# Patient Record
Sex: MALE | Race: WHITE | NOT HISPANIC OR LATINO | Employment: FULL TIME
[De-identification: names, ages, dates, MRNs, and addresses within clinical notes are randomized per-mention and may not be internally consistent; named-entity substitution may affect disease eponyms.]

---

## 2017-09-06 ENCOUNTER — HOSPITAL (OUTPATIENT)
Dept: OTHER | Age: 82
End: 2017-09-06

## 2017-09-08 ENCOUNTER — DIAGNOSTIC TRANS (OUTPATIENT)
Dept: OTHER | Age: 82
End: 2017-09-08

## 2017-10-11 ENCOUNTER — HOSPITAL (OUTPATIENT)
Dept: OTHER | Age: 82
End: 2017-10-11

## 2017-10-11 ENCOUNTER — CHARTING TRANS (OUTPATIENT)
Dept: OTHER | Age: 82
End: 2017-10-11

## 2018-01-13 ENCOUNTER — HOSPITAL (OUTPATIENT)
Dept: OTHER | Age: 83
End: 2018-01-13
Attending: INTERNAL MEDICINE

## 2018-01-27 ENCOUNTER — DIAGNOSTIC TRANS (OUTPATIENT)
Dept: OTHER | Age: 83
End: 2018-01-27

## 2018-04-05 ENCOUNTER — HOSPITAL (OUTPATIENT)
Dept: OTHER | Age: 83
End: 2018-04-05

## 2018-04-24 ENCOUNTER — DIAGNOSTIC TRANS (OUTPATIENT)
Dept: OTHER | Age: 83
End: 2018-04-24

## 2018-12-07 PROCEDURE — 84402 ASSAY OF FREE TESTOSTERONE: CPT | Performed by: INTERNAL MEDICINE

## 2018-12-07 PROCEDURE — 84403 ASSAY OF TOTAL TESTOSTERONE: CPT | Performed by: INTERNAL MEDICINE

## 2018-12-07 PROCEDURE — 81003 URINALYSIS AUTO W/O SCOPE: CPT | Performed by: INTERNAL MEDICINE

## 2019-01-17 ENCOUNTER — HOSPITAL (OUTPATIENT)
Dept: OTHER | Age: 84
End: 2019-01-17
Attending: UROLOGY

## 2019-08-21 ENCOUNTER — DIAGNOSTIC TRANS (OUTPATIENT)
Dept: OTHER | Age: 84
End: 2019-08-21

## 2019-08-21 ENCOUNTER — HOSPITAL (OUTPATIENT)
Dept: OTHER | Age: 84
End: 2019-08-21

## 2019-08-21 LAB
ALBUMIN SERPL-MCNC: 3.7 G/DL (ref 3.6–5.1)
ALBUMIN/GLOB SERPL: 0.9 {RATIO} (ref 1–2.4)
ALP SERPL-CCNC: 84 UNITS/L (ref 45–117)
ALT SERPL-CCNC: 17 UNITS/L
ANALYZER ANC (IANC): ABNORMAL
ANION GAP SERPL CALC-SCNC: 10 MMOL/L (ref 10–20)
AST SERPL-CCNC: 17 UNITS/L
BASOPHILS # BLD: 0 K/MCL (ref 0–0.3)
BASOPHILS NFR BLD: 0 %
BILIRUB SERPL-MCNC: 0.5 MG/DL (ref 0.2–1)
BUN SERPL-MCNC: 20 MG/DL (ref 6–20)
BUN/CREAT SERPL: 19 (ref 7–25)
CALCIUM SERPL-MCNC: 9.1 MG/DL (ref 8.4–10.2)
CHLORIDE SERPL-SCNC: 105 MMOL/L (ref 98–107)
CO2 SERPL-SCNC: 28 MMOL/L (ref 21–32)
CREAT SERPL-MCNC: 1.03 MG/DL (ref 0.67–1.17)
DIFFERENTIAL METHOD BLD: ABNORMAL
EOSINOPHIL # BLD: 0.1 K/MCL (ref 0.1–0.5)
EOSINOPHIL NFR BLD: 1 %
ERYTHROCYTE [DISTWIDTH] IN BLOOD: 12.6 % (ref 11–15)
GLOBULIN SER-MCNC: 4.1 G/DL (ref 2–4)
GLUCOSE SERPL-MCNC: 99 MG/DL (ref 65–99)
HCT VFR BLD CALC: 45.8 % (ref 39–51)
HGB BLD-MCNC: 15.3 G/DL (ref 13–17)
IMM GRANULOCYTES # BLD AUTO: 0 K/MCL (ref 0–0.2)
IMM GRANULOCYTES NFR BLD: 0 %
LYMPHOCYTES # BLD: 1.2 K/MCL (ref 1–4)
LYMPHOCYTES NFR BLD: 16 %
MAGNESIUM SERPL-MCNC: 2.1 MG/DL (ref 1.7–2.4)
MCH RBC QN AUTO: 34.2 PG (ref 26–34)
MCHC RBC AUTO-ENTMCNC: 33.4 G/DL (ref 32–36.5)
MCV RBC AUTO: 102.2 FL (ref 78–100)
MONOCYTES # BLD: 0.7 K/MCL (ref 0.3–0.9)
MONOCYTES NFR BLD: 9 %
NEUTROPHILS # BLD: 5.7 K/MCL (ref 1.8–7.7)
NEUTROPHILS NFR BLD: 74 %
NEUTS SEG NFR BLD: ABNORMAL %
NRBC (NRBCRE): 0 /100 WBC
PLATELET # BLD: 165 K/MCL (ref 140–450)
POTASSIUM SERPL-SCNC: 4.3 MMOL/L (ref 3.4–5.1)
POTASSIUM SERPL-SCNC: ABNORMAL MMOL/L
PROT SERPL-MCNC: 7.8 G/DL (ref 6.4–8.2)
RBC # BLD: 4.48 MIL/MCL (ref 4.5–5.9)
SODIUM SERPL-SCNC: 139 MMOL/L (ref 135–145)
TROPONIN I SERPL HS-MCNC: <0.02 NG/ML
WBC # BLD: 7.8 K/MCL (ref 4.2–11)

## 2019-09-25 ENCOUNTER — DIAGNOSTIC TRANS (OUTPATIENT)
Dept: OTHER | Age: 84
End: 2019-09-25

## 2020-10-31 ENCOUNTER — LAB SERVICES (OUTPATIENT)
Dept: LAB | Age: 85
End: 2020-10-31

## 2020-10-31 DIAGNOSIS — Z01.818 PRE-OP TESTING: Primary | ICD-10-CM

## 2020-10-31 LAB
SARS-COV-2 RNA RESP QL NAA+PROBE: NOT DETECTED
SERVICE CMNT-IMP: NORMAL
SPECIMEN SOURCE: NORMAL

## 2020-10-31 PROCEDURE — U0003 INFECTIOUS AGENT DETECTION BY NUCLEIC ACID (DNA OR RNA); SEVERE ACUTE RESPIRATORY SYNDROME CORONAVIRUS 2 (SARS-COV-2) (CORONAVIRUS DISEASE [COVID-19]), AMPLIFIED PROBE TECHNIQUE, MAKING USE OF HIGH THROUGHPUT TECHNOLOGIES AS DESCRIBED BY CMS-2020-01-R: HCPCS | Performed by: FAMILY MEDICINE

## 2021-01-13 ENCOUNTER — APPOINTMENT (OUTPATIENT)
Dept: GASTROENTEROLOGY | Age: 86
End: 2021-01-13

## 2021-03-10 DIAGNOSIS — Z23 NEED FOR VACCINATION: ICD-10-CM

## 2021-04-28 RX ORDER — LATANOPROST 50 UG/ML
1 SOLUTION/ DROPS OPHTHALMIC
COMMUNITY
Start: 1900-01-01

## 2021-04-28 RX ORDER — TIMOLOL MALEATE 5 MG/ML
1 SOLUTION/ DROPS OPHTHALMIC
COMMUNITY
Start: 1900-01-01

## 2021-04-28 ASSESSMENT — ACTIVITIES OF DAILY LIVING (ADL): SENSORY_SUPPORT_DEVICES: HEARING AIDS;EYEGLASSES

## 2021-05-03 ENCOUNTER — LAB SERVICES (OUTPATIENT)
Dept: LAB | Age: 86
End: 2021-05-03

## 2021-05-03 DIAGNOSIS — Z01.812 PRE-PROCEDURAL LABORATORY EXAMINATIONS: ICD-10-CM

## 2021-05-03 LAB
SARS-COV-2 RNA RESP QL NAA+PROBE: NOT DETECTED
SERVICE CMNT-IMP: NORMAL
SERVICE CMNT-IMP: NORMAL

## 2021-05-03 PROCEDURE — U0003 INFECTIOUS AGENT DETECTION BY NUCLEIC ACID (DNA OR RNA); SEVERE ACUTE RESPIRATORY SYNDROME CORONAVIRUS 2 (SARS-COV-2) (CORONAVIRUS DISEASE [COVID-19]), AMPLIFIED PROBE TECHNIQUE, MAKING USE OF HIGH THROUGHPUT TECHNOLOGIES AS DESCRIBED BY CMS-2020-01-R: HCPCS | Performed by: PSYCHIATRY & NEUROLOGY

## 2021-05-03 PROCEDURE — U0005 INFEC AGEN DETEC AMPLI PROBE: HCPCS | Performed by: PSYCHIATRY & NEUROLOGY

## 2021-05-05 ENCOUNTER — HOSPITAL ENCOUNTER (OUTPATIENT)
Dept: GASTROENTEROLOGY | Age: 86
Discharge: HOME | End: 2021-05-05

## 2021-05-05 ENCOUNTER — ANESTHESIA EVENT (OUTPATIENT)
Dept: GASTROENTEROLOGY | Age: 86
End: 2021-05-05

## 2021-05-05 ENCOUNTER — ANESTHESIA (OUTPATIENT)
Dept: GASTROENTEROLOGY | Age: 86
End: 2021-05-05

## 2021-05-05 VITALS
WEIGHT: 175 LBS | TEMPERATURE: 98.1 F | OXYGEN SATURATION: 100 % | DIASTOLIC BLOOD PRESSURE: 68 MMHG | BODY MASS INDEX: 23.19 KG/M2 | HEART RATE: 61 BPM | HEIGHT: 73 IN | SYSTOLIC BLOOD PRESSURE: 116 MMHG | RESPIRATION RATE: 15 BRPM

## 2021-05-05 DIAGNOSIS — Z01.812 PRE-PROCEDURAL LABORATORY EXAMINATIONS: Primary | ICD-10-CM

## 2021-05-05 DIAGNOSIS — Z86.010 PERSONAL HISTORY OF COLONIC POLYPS: ICD-10-CM

## 2021-05-05 PROCEDURE — 13000008 HB ANESTHESIA MAC OUTSIDE OR

## 2021-05-05 PROCEDURE — 10002800 HB RX 250 W HCPCS: Performed by: ANESTHESIOLOGY

## 2021-05-05 PROCEDURE — 13000024 HB GI COMPLEX CASE S/U + 1ST 15 MIN

## 2021-05-05 PROCEDURE — 10002801 HB RX 250 W/O HCPCS: Performed by: ANESTHESIOLOGY

## 2021-05-05 PROCEDURE — 13000025 HB GI COMPLEX CASE EACH ADD MINUTE

## 2021-05-05 PROCEDURE — 10002807 HB RX 258

## 2021-05-05 PROCEDURE — 88305 TISSUE EXAM BY PATHOLOGIST: CPT

## 2021-05-05 PROCEDURE — 13000001 HB PHASE II RECOVERY EA 30 MINUTES

## 2021-05-05 PROCEDURE — 10002807 HB RX 258: Performed by: ANESTHESIOLOGY

## 2021-05-05 RX ORDER — PROPOFOL 10 MG/ML
INJECTION, EMULSION INTRAVENOUS PRN
Status: DISCONTINUED | OUTPATIENT
Start: 2021-05-05 | End: 2021-05-05

## 2021-05-05 RX ORDER — SODIUM CHLORIDE 9 MG/ML
INJECTION, SOLUTION INTRAVENOUS CONTINUOUS PRN
Status: DISCONTINUED | OUTPATIENT
Start: 2021-05-05 | End: 2021-05-05

## 2021-05-05 RX ORDER — LIDOCAINE HYDROCHLORIDE 10 MG/ML
INJECTION, SOLUTION INFILTRATION; PERINEURAL PRN
Status: DISCONTINUED | OUTPATIENT
Start: 2021-05-05 | End: 2021-05-05

## 2021-05-05 RX ORDER — SODIUM CHLORIDE 9 MG/ML
INJECTION, SOLUTION INTRAVENOUS ONCE
Status: COMPLETED | OUTPATIENT
Start: 2021-05-05 | End: 2021-05-05

## 2021-05-05 RX ADMIN — PROPOFOL 20 MG: 10 INJECTION, EMULSION INTRAVENOUS at 10:56

## 2021-05-05 RX ADMIN — PROPOFOL 10 MG: 10 INJECTION, EMULSION INTRAVENOUS at 10:53

## 2021-05-05 RX ADMIN — PROPOFOL 20 MG: 10 INJECTION, EMULSION INTRAVENOUS at 10:52

## 2021-05-05 RX ADMIN — PROPOFOL 30 MG: 10 INJECTION, EMULSION INTRAVENOUS at 10:41

## 2021-05-05 RX ADMIN — PROPOFOL 20 MG: 10 INJECTION, EMULSION INTRAVENOUS at 10:55

## 2021-05-05 RX ADMIN — PROPOFOL 10 MG: 10 INJECTION, EMULSION INTRAVENOUS at 10:49

## 2021-05-05 RX ADMIN — PROPOFOL 20 MG: 10 INJECTION, EMULSION INTRAVENOUS at 10:44

## 2021-05-05 RX ADMIN — SODIUM CHLORIDE: 9 INJECTION, SOLUTION INTRAVENOUS at 09:43

## 2021-05-05 RX ADMIN — PROPOFOL 20 MG: 10 INJECTION, EMULSION INTRAVENOUS at 10:43

## 2021-05-05 RX ADMIN — PROPOFOL 10 MG: 10 INJECTION, EMULSION INTRAVENOUS at 10:50

## 2021-05-05 RX ADMIN — PROPOFOL 10 MG: 10 INJECTION, EMULSION INTRAVENOUS at 10:54

## 2021-05-05 RX ADMIN — PROPOFOL 20 MG: 10 INJECTION, EMULSION INTRAVENOUS at 10:48

## 2021-05-05 RX ADMIN — PROPOFOL 20 MG: 10 INJECTION, EMULSION INTRAVENOUS at 10:51

## 2021-05-05 RX ADMIN — PROPOFOL 10 MG: 10 INJECTION, EMULSION INTRAVENOUS at 10:46

## 2021-05-05 RX ADMIN — PROPOFOL 10 MG: 10 INJECTION, EMULSION INTRAVENOUS at 10:45

## 2021-05-05 RX ADMIN — PROPOFOL 30 MG: 10 INJECTION, EMULSION INTRAVENOUS at 10:42

## 2021-05-05 RX ADMIN — SODIUM CHLORIDE: 9 INJECTION, SOLUTION INTRAVENOUS at 10:38

## 2021-05-05 RX ADMIN — PROPOFOL 50 MG: 10 INJECTION, EMULSION INTRAVENOUS at 10:40

## 2021-05-05 RX ADMIN — PROPOFOL 20 MG: 10 INJECTION, EMULSION INTRAVENOUS at 10:47

## 2021-05-05 RX ADMIN — LIDOCAINE HYDROCHLORIDE 50 MG: 10 INJECTION, SOLUTION INFILTRATION; PERINEURAL at 10:40

## 2021-05-05 ASSESSMENT — COGNITIVE AND FUNCTIONAL STATUS - GENERAL
ARE YOU DEAF OR DO YOU HAVE SERIOUS DIFFICULTY  HEARING: NO
ARE YOU BLIND OR DO YOU HAVE SERIOUS DIFFICULTY SEEING, EVEN WHEN WEARING GLASSES: NO

## 2021-05-05 ASSESSMENT — ACTIVITIES OF DAILY LIVING (ADL)
HISTORY OF FALLING IN THE LAST YEAR (PRIOR TO ADMISSION): NO
RECENT_DECLINE_ADL: NO
ADL_SCORE: 12
NEEDS_ASSIST: NO
SENSORY_SUPPORT_DEVICES: EYEGLASSES;HEARING AIDS
ADL_BEFORE_ADMISSION: INDEPENDENT
CHRONIC_PAIN_PRESENT: NO
ADL_SHORT_OF_BREATH: NO

## 2021-05-05 ASSESSMENT — PAIN SCALES - GENERAL
PAINLEVEL_OUTOF10: 0

## 2021-05-05 ASSESSMENT — ENCOUNTER SYMPTOMS: EXERCISE TOLERANCE: GOOD (>4 METS)

## 2021-05-06 LAB
ASR DISCLAIMER: NORMAL
CASE RPRT: NORMAL
CLINICAL INFO: NORMAL
PATH REPORT.FINAL DX SPEC: NORMAL
PATH REPORT.GROSS SPEC: NORMAL

## 2021-08-12 ENCOUNTER — LAB SERVICES (OUTPATIENT)
Dept: LAB | Age: 86
End: 2021-08-12

## 2021-08-12 DIAGNOSIS — Z01.818 PRE-OP TESTING: Primary | ICD-10-CM

## 2021-08-12 PROCEDURE — U0003 INFECTIOUS AGENT DETECTION BY NUCLEIC ACID (DNA OR RNA); SEVERE ACUTE RESPIRATORY SYNDROME CORONAVIRUS 2 (SARS-COV-2) (CORONAVIRUS DISEASE [COVID-19]), AMPLIFIED PROBE TECHNIQUE, MAKING USE OF HIGH THROUGHPUT TECHNOLOGIES AS DESCRIBED BY CMS-2020-01-R: HCPCS | Performed by: EMERGENCY MEDICINE

## 2021-08-12 PROCEDURE — U0005 INFEC AGEN DETEC AMPLI PROBE: HCPCS | Performed by: EMERGENCY MEDICINE

## 2021-08-13 ENCOUNTER — APPOINTMENT (OUTPATIENT)
Dept: LAB | Age: 86
End: 2021-08-13

## 2021-08-13 LAB
SARS-COV-2 RNA RESP QL NAA+PROBE: NOT DETECTED
SERVICE CMNT-IMP: NORMAL
SERVICE CMNT-IMP: NORMAL

## 2021-10-03 ENCOUNTER — HOSPITAL ENCOUNTER (EMERGENCY)
Age: 86
Discharge: HOME | End: 2021-10-03
Attending: EMERGENCY MEDICINE

## 2021-10-03 VITALS
RESPIRATION RATE: 14 BRPM | HEART RATE: 61 BPM | TEMPERATURE: 98.2 F | SYSTOLIC BLOOD PRESSURE: 121 MMHG | DIASTOLIC BLOOD PRESSURE: 72 MMHG | OXYGEN SATURATION: 93 % | WEIGHT: 176 LBS | BODY MASS INDEX: 23.22 KG/M2

## 2021-10-03 DIAGNOSIS — U07.1 COVID-19 VIRUS INFECTION: Primary | ICD-10-CM

## 2021-10-03 LAB
SARS-COV-2 N GENE CT SPEC QN NAA N2: 17.8
SARS-COV-2 RNA RESP QL NAA+PROBE: DETECTED
SERVICE CMNT-IMP: ABNORMAL

## 2021-10-03 PROCEDURE — 99283 EMERGENCY DEPT VISIT LOW MDM: CPT

## 2021-10-03 PROCEDURE — 93005 ELECTROCARDIOGRAM TRACING: CPT | Performed by: EMERGENCY MEDICINE

## 2021-10-03 PROCEDURE — C9803 HOPD COVID-19 SPEC COLLECT: HCPCS

## 2021-10-03 PROCEDURE — 10004651 HB RX, NO CHARGE ITEM: Performed by: EMERGENCY MEDICINE

## 2021-10-03 PROCEDURE — M0243 CASIRIVI AND IMDEVI INFUSION: HCPCS

## 2021-10-03 PROCEDURE — 10002807 HB RX 258: Performed by: EMERGENCY MEDICINE

## 2021-10-03 PROCEDURE — 10002800 HB RX 250 W HCPCS: Performed by: EMERGENCY MEDICINE

## 2021-10-03 PROCEDURE — 87635 SARS-COV-2 COVID-19 AMP PRB: CPT | Performed by: EMERGENCY MEDICINE

## 2021-10-03 RX ORDER — SODIUM CHLORIDE 9 MG/ML
INJECTION, SOLUTION INTRAVENOUS CONTINUOUS PRN
Status: DISCONTINUED | OUTPATIENT
Start: 2021-10-03 | End: 2021-10-03 | Stop reason: HOSPADM

## 2021-10-03 RX ORDER — ALBUTEROL SULFATE 2.5 MG/3ML
5 SOLUTION RESPIRATORY (INHALATION)
Status: DISCONTINUED | OUTPATIENT
Start: 2021-10-03 | End: 2021-10-03 | Stop reason: HOSPADM

## 2021-10-03 RX ORDER — 0.9 % SODIUM CHLORIDE 0.9 %
2 VIAL (ML) INJECTION EVERY 12 HOURS SCHEDULED
Status: DISCONTINUED | OUTPATIENT
Start: 2021-10-03 | End: 2021-10-03 | Stop reason: HOSPADM

## 2021-10-03 RX ORDER — FAMOTIDINE 10 MG/ML
20 INJECTION, SOLUTION INTRAVENOUS
Status: DISCONTINUED | OUTPATIENT
Start: 2021-10-03 | End: 2021-10-03 | Stop reason: HOSPADM

## 2021-10-03 RX ORDER — DIPHENHYDRAMINE HYDROCHLORIDE 50 MG/ML
50 INJECTION INTRAMUSCULAR; INTRAVENOUS
Status: DISCONTINUED | OUTPATIENT
Start: 2021-10-03 | End: 2021-10-03 | Stop reason: HOSPADM

## 2021-10-03 RX ORDER — ACETAMINOPHEN 325 MG/1
650 TABLET ORAL
Status: COMPLETED | OUTPATIENT
Start: 2021-10-03 | End: 2021-10-03

## 2021-10-03 RX ORDER — METHYLPREDNISOLONE SODIUM SUCCINATE 125 MG/2ML
125 INJECTION, POWDER, LYOPHILIZED, FOR SOLUTION INTRAMUSCULAR; INTRAVENOUS
Status: DISCONTINUED | OUTPATIENT
Start: 2021-10-03 | End: 2021-10-03 | Stop reason: HOSPADM

## 2021-10-03 RX ADMIN — CASIRIVIMAB AND IMDEVIMAB 600 MG OF CASIRIVIMAB: 600; 600 INJECTION, SOLUTION, CONCENTRATE INTRAVENOUS at 16:43

## 2021-10-03 RX ADMIN — ACETAMINOPHEN 650 MG: 325 TABLET ORAL at 16:03

## 2021-10-03 ASSESSMENT — PAIN DESCRIPTION - PAIN TYPE: TYPE: ACUTE PAIN

## 2021-10-03 ASSESSMENT — PAIN SCALES - GENERAL: PAINLEVEL_OUTOF10: 1

## 2021-10-04 LAB
ATRIAL RATE (BPM): 71
P AXIS (DEGREES): 33
PR-INTERVAL (MSEC): 208
QRS-INTERVAL (MSEC): 100
QT-INTERVAL (MSEC): 376
QTC: 409
R AXIS (DEGREES): -42
REPORT TEXT: NORMAL
T AXIS (DEGREES): 29
VENTRICULAR RATE EKG/MIN (BPM): 71

## 2022-05-17 ENCOUNTER — LAB SERVICES (OUTPATIENT)
Dept: ADMINISTRATIVE | Age: 87
End: 2022-05-17

## 2022-05-17 DIAGNOSIS — Z01.812 ENCOUNTER FOR PREPROCEDURE SCREENING LABORATORY TESTING FOR COVID-19: Primary | ICD-10-CM

## 2022-05-17 DIAGNOSIS — Z11.52 ENCOUNTER FOR PREPROCEDURE SCREENING LABORATORY TESTING FOR COVID-19: Primary | ICD-10-CM

## 2022-05-17 DIAGNOSIS — R13.10 DYSPHAGIA: Primary | ICD-10-CM

## 2022-06-05 ENCOUNTER — APPOINTMENT (OUTPATIENT)
Dept: LAB | Age: 87
End: 2022-06-05

## 2022-06-05 ENCOUNTER — LAB SERVICES (OUTPATIENT)
Dept: LAB | Age: 87
End: 2022-06-05

## 2022-06-05 DIAGNOSIS — Z01.812 ENCOUNTER FOR PREPROCEDURE SCREENING LABORATORY TESTING FOR COVID-19: ICD-10-CM

## 2022-06-05 DIAGNOSIS — Z11.52 ENCOUNTER FOR PREPROCEDURE SCREENING LABORATORY TESTING FOR COVID-19: ICD-10-CM

## 2022-06-05 PROCEDURE — U0003 INFECTIOUS AGENT DETECTION BY NUCLEIC ACID (DNA OR RNA); SEVERE ACUTE RESPIRATORY SYNDROME CORONAVIRUS 2 (SARS-COV-2) (CORONAVIRUS DISEASE [COVID-19]), AMPLIFIED PROBE TECHNIQUE, MAKING USE OF HIGH THROUGHPUT TECHNOLOGIES AS DESCRIBED BY CMS-2020-01-R: HCPCS | Performed by: INTERNAL MEDICINE

## 2022-06-05 PROCEDURE — U0005 INFEC AGEN DETEC AMPLI PROBE: HCPCS | Performed by: INTERNAL MEDICINE

## 2022-06-06 LAB
SARS-COV-2 RNA RESP QL NAA+PROBE: NOT DETECTED
SERVICE CMNT-IMP: NORMAL
SERVICE CMNT-IMP: NORMAL

## 2022-06-08 ENCOUNTER — TELEPHONE (OUTPATIENT)
Dept: SCHEDULING | Age: 87
End: 2022-06-08

## 2022-06-08 ENCOUNTER — HOSPITAL ENCOUNTER (OUTPATIENT)
Dept: GENERAL RADIOLOGY | Age: 87
Discharge: HOME | End: 2022-06-08
Attending: INTERNAL MEDICINE

## 2022-06-08 DIAGNOSIS — R13.10 DYSPHAGIA: ICD-10-CM

## 2022-06-08 PROCEDURE — 74230 X-RAY XM SWLNG FUNCJ C+: CPT

## 2022-06-08 PROCEDURE — 92611 MOTION FLUOROSCOPY/SWALLOW: CPT | Performed by: SPEECH-LANGUAGE PATHOLOGIST

## 2022-06-15 ENCOUNTER — LAB SERVICES (OUTPATIENT)
Dept: LAB | Age: 87
End: 2022-06-15

## 2022-06-15 DIAGNOSIS — Z01.818 PRE-OP TESTING: Primary | ICD-10-CM

## 2022-06-16 PROCEDURE — U0003 INFECTIOUS AGENT DETECTION BY NUCLEIC ACID (DNA OR RNA); SEVERE ACUTE RESPIRATORY SYNDROME CORONAVIRUS 2 (SARS-COV-2) (CORONAVIRUS DISEASE [COVID-19]), AMPLIFIED PROBE TECHNIQUE, MAKING USE OF HIGH THROUGHPUT TECHNOLOGIES AS DESCRIBED BY CMS-2020-01-R: HCPCS | Performed by: INTERNAL MEDICINE

## 2022-06-16 PROCEDURE — U0005 INFEC AGEN DETEC AMPLI PROBE: HCPCS | Performed by: INTERNAL MEDICINE

## 2022-06-17 LAB
SARS-COV-2 RNA RESP QL NAA+PROBE: NOT DETECTED
SERVICE CMNT-IMP: NORMAL
SERVICE CMNT-IMP: NORMAL

## 2022-11-07 ENCOUNTER — APPOINTMENT (OUTPATIENT)
Dept: URBAN - METROPOLITAN AREA CLINIC 240 | Age: 87
Setting detail: DERMATOLOGY
End: 2022-11-07

## 2022-11-07 DIAGNOSIS — D22 MELANOCYTIC NEVI: ICD-10-CM

## 2022-11-07 DIAGNOSIS — Z86.007 PERSONAL HISTORY OF IN-SITU NEOPLASM OF SKIN: ICD-10-CM

## 2022-11-07 DIAGNOSIS — Z85.828 PERSONAL HISTORY OF OTHER MALIGNANT NEOPLASM OF SKIN: ICD-10-CM

## 2022-11-07 DIAGNOSIS — D18.0 HEMANGIOMA: ICD-10-CM

## 2022-11-07 DIAGNOSIS — L57.0 ACTINIC KERATOSIS: ICD-10-CM

## 2022-11-07 DIAGNOSIS — L81.4 OTHER MELANIN HYPERPIGMENTATION: ICD-10-CM

## 2022-11-07 DIAGNOSIS — L82.1 OTHER SEBORRHEIC KERATOSIS: ICD-10-CM

## 2022-11-07 PROBLEM — D22.62 MELANOCYTIC NEVI OF LEFT UPPER LIMB, INCLUDING SHOULDER: Status: ACTIVE | Noted: 2022-11-07

## 2022-11-07 PROBLEM — D18.01 HEMANGIOMA OF SKIN AND SUBCUTANEOUS TISSUE: Status: ACTIVE | Noted: 2022-11-07

## 2022-11-07 PROBLEM — D22.61 MELANOCYTIC NEVI OF RIGHT UPPER LIMB, INCLUDING SHOULDER: Status: ACTIVE | Noted: 2022-11-07

## 2022-11-07 PROBLEM — D22.5 MELANOCYTIC NEVI OF TRUNK: Status: ACTIVE | Noted: 2022-11-07

## 2022-11-07 PROCEDURE — 17000 DESTRUCT PREMALG LESION: CPT

## 2022-11-07 PROCEDURE — OTHER COUNSELING: OTHER

## 2022-11-07 PROCEDURE — OTHER LIQUID NITROGEN: OTHER

## 2022-11-07 PROCEDURE — 17003 DESTRUCT PREMALG LES 2-14: CPT

## 2022-11-07 PROCEDURE — 99213 OFFICE O/P EST LOW 20 MIN: CPT | Mod: 25

## 2022-11-07 ASSESSMENT — LOCATION SIMPLE DESCRIPTION DERM
LOCATION SIMPLE: CHEST
LOCATION SIMPLE: RIGHT THIGH
LOCATION SIMPLE: LEFT THIGH
LOCATION SIMPLE: LEFT UPPER ARM
LOCATION SIMPLE: TRAPEZIAL NECK
LOCATION SIMPLE: RIGHT UPPER ARM
LOCATION SIMPLE: POSTERIOR NECK
LOCATION SIMPLE: UPPER BACK
LOCATION SIMPLE: INFERIOR FOREHEAD
LOCATION SIMPLE: RIGHT HAND
LOCATION SIMPLE: RIGHT TEMPLE
LOCATION SIMPLE: RIGHT FOREHEAD

## 2022-11-07 ASSESSMENT — LOCATION DETAILED DESCRIPTION DERM
LOCATION DETAILED: LEFT POSTERIOR NECK
LOCATION DETAILED: 4TH WEB SPACE RIGHT HAND
LOCATION DETAILED: RIGHT PROXIMAL POSTERIOR UPPER ARM
LOCATION DETAILED: RIGHT MID TEMPLE
LOCATION DETAILED: 3RD WEB SPACE RIGHT HAND
LOCATION DETAILED: LEFT DISTAL POSTERIOR UPPER ARM
LOCATION DETAILED: SUPERIOR THORACIC SPINE
LOCATION DETAILED: RIGHT INFERIOR TEMPLE
LOCATION DETAILED: RIGHT ULNAR DORSAL HAND
LOCATION DETAILED: INFERIOR MID FOREHEAD
LOCATION DETAILED: RIGHT LATERAL FOREHEAD
LOCATION DETAILED: RIGHT DORSAL MIDDLE METACARPOPHALANGEAL JOINT
LOCATION DETAILED: LEFT PROXIMAL POSTERIOR UPPER ARM
LOCATION DETAILED: RIGHT ANTERIOR DISTAL THIGH
LOCATION DETAILED: STERNUM
LOCATION DETAILED: RIGHT DORSAL RING METACARPOPHALANGEAL JOINT
LOCATION DETAILED: RIGHT RADIAL DORSAL HAND
LOCATION DETAILED: RIGHT DISTAL POSTERIOR UPPER ARM
LOCATION DETAILED: MID POSTERIOR NECK
LOCATION DETAILED: MID TRAPEZIAL NECK
LOCATION DETAILED: LEFT ANTERIOR DISTAL THIGH
LOCATION DETAILED: RIGHT INFERIOR LATERAL FOREHEAD

## 2022-11-07 ASSESSMENT — LOCATION ZONE DERM
LOCATION ZONE: HAND
LOCATION ZONE: TRUNK
LOCATION ZONE: LEG
LOCATION ZONE: NECK
LOCATION ZONE: FACE
LOCATION ZONE: ARM

## 2022-11-07 NOTE — PROCEDURE: LIQUID NITROGEN
Detail Level: Detailed
Show Aperture Variable?: Yes
Post-Care Instructions: I reviewed with the patient in detail post-care instructions. Patient is to wear sunprotection, and avoid picking at any of the treated lesions.
Consent: The patient's consent was obtained including but not limited to risks of crusting, scabbing, blistering, scarring, darker or lighter pigmentary change, recurrence, incomplete removal and infection.
Render Note In Bullet Format When Appropriate: No
Duration Of Freeze Thaw-Cycle (Seconds): 0

## 2023-07-06 ENCOUNTER — APPOINTMENT (OUTPATIENT)
Dept: URBAN - METROPOLITAN AREA CLINIC 240 | Age: 88
Setting detail: DERMATOLOGY
End: 2023-07-06

## 2023-07-06 DIAGNOSIS — Z86.007 PERSONAL HISTORY OF IN-SITU NEOPLASM OF SKIN: ICD-10-CM

## 2023-07-06 DIAGNOSIS — Z85.828 PERSONAL HISTORY OF OTHER MALIGNANT NEOPLASM OF SKIN: ICD-10-CM

## 2023-07-06 DIAGNOSIS — L81.4 OTHER MELANIN HYPERPIGMENTATION: ICD-10-CM

## 2023-07-06 DIAGNOSIS — L82.1 OTHER SEBORRHEIC KERATOSIS: ICD-10-CM

## 2023-07-06 DIAGNOSIS — D69.2 OTHER NONTHROMBOCYTOPENIC PURPURA: ICD-10-CM

## 2023-07-06 DIAGNOSIS — L57.8 OTHER SKIN CHANGES DUE TO CHRONIC EXPOSURE TO NONIONIZING RADIATION: ICD-10-CM

## 2023-07-06 DIAGNOSIS — D36.1 BENIGN NEOPLASM OF PERIPHERAL NERVES AND AUTONOMIC NERVOUS SYSTEM: ICD-10-CM

## 2023-07-06 DIAGNOSIS — L57.0 ACTINIC KERATOSIS: ICD-10-CM

## 2023-07-06 DIAGNOSIS — D22 MELANOCYTIC NEVI: ICD-10-CM

## 2023-07-06 DIAGNOSIS — D18.0 HEMANGIOMA: ICD-10-CM

## 2023-07-06 PROBLEM — D22.4 MELANOCYTIC NEVI OF SCALP AND NECK: Status: ACTIVE | Noted: 2023-07-06

## 2023-07-06 PROBLEM — D18.01 HEMANGIOMA OF SKIN AND SUBCUTANEOUS TISSUE: Status: ACTIVE | Noted: 2023-07-06

## 2023-07-06 PROBLEM — D36.11 BENIGN NEOPLASM OF PERIPHERAL NERVES AND AUTONOMIC NERVOUS SYSTEM OF FACE, HEAD, AND NECK: Status: ACTIVE | Noted: 2023-07-06

## 2023-07-06 PROCEDURE — OTHER MIPS QUALITY: OTHER

## 2023-07-06 PROCEDURE — 17003 DESTRUCT PREMALG LES 2-14: CPT

## 2023-07-06 PROCEDURE — 17000 DESTRUCT PREMALG LESION: CPT

## 2023-07-06 PROCEDURE — OTHER DIAGNOSIS COMMENT: OTHER

## 2023-07-06 PROCEDURE — OTHER LIQUID NITROGEN: OTHER

## 2023-07-06 PROCEDURE — 99213 OFFICE O/P EST LOW 20 MIN: CPT | Mod: 25

## 2023-07-06 PROCEDURE — OTHER COUNSELING: OTHER

## 2023-07-06 PROCEDURE — OTHER PHOTODYNAMIC THERAPY COUNSELING: OTHER

## 2023-07-06 PROCEDURE — OTHER ORDER FOR PHOTODYNAMIC THERAPY: OTHER

## 2023-07-06 ASSESSMENT — LOCATION DETAILED DESCRIPTION DERM
LOCATION DETAILED: RIGHT CENTRAL TEMPLE
LOCATION DETAILED: LEFT DISTAL DORSAL FOREARM
LOCATION DETAILED: RIGHT INFERIOR FOREHEAD
LOCATION DETAILED: RIGHT CLAVICULAR NECK
LOCATION DETAILED: LEFT SUPERIOR LATERAL MALAR CHEEK
LOCATION DETAILED: LEFT RIB CAGE
LOCATION DETAILED: RIGHT SUPERIOR UPPER BACK
LOCATION DETAILED: LEFT INFERIOR CENTRAL MALAR CHEEK
LOCATION DETAILED: LEFT LATERAL MANDIBULAR CHEEK
LOCATION DETAILED: LEFT CENTRAL MALAR CHEEK
LOCATION DETAILED: RIGHT DISTAL DORSAL FOREARM
LOCATION DETAILED: LEFT SUPERIOR CENTRAL MALAR CHEEK
LOCATION DETAILED: LEFT CLAVICULAR NECK

## 2023-07-06 ASSESSMENT — LOCATION SIMPLE DESCRIPTION DERM
LOCATION SIMPLE: LEFT ANTERIOR NECK
LOCATION SIMPLE: LEFT CHEEK
LOCATION SIMPLE: RIGHT FOREHEAD
LOCATION SIMPLE: ABDOMEN
LOCATION SIMPLE: RIGHT TEMPLE
LOCATION SIMPLE: RIGHT ANTERIOR NECK
LOCATION SIMPLE: RIGHT UPPER BACK
LOCATION SIMPLE: LEFT FOREARM
LOCATION SIMPLE: RIGHT FOREARM

## 2023-07-06 ASSESSMENT — LOCATION ZONE DERM
LOCATION ZONE: ARM
LOCATION ZONE: TRUNK
LOCATION ZONE: FACE
LOCATION ZONE: NECK

## 2023-07-06 NOTE — PROCEDURE: ORDER FOR PHOTODYNAMIC THERAPY
Arms And Hands Incubation Time: 2 Hours
Debridement: No
Detail Level: Simple
Pdt Type: ELSA-U
Neck Incubation Time: 1 Hour
Face And Scalp Incubation Time: 1 Hour for the face and 2 Hours for the scalp
Frequency Of Pdt: Single Treatment
Location: Face
Face Incubation Time: 1 Hour/ 1000 seconds
Consent: The procedure and risks were reviewed with the patient including but not limited to: burning, pigmentary changes, pain, blistering, scabbing, redness, and the possibility of needing numerous treatments. Strict photoprotection after the procedure was also discussed.
Photosensitizer: Levulan

## 2023-07-06 NOTE — PROCEDURE: LIQUID NITROGEN
Render Note In Bullet Format When Appropriate: No
Detail Level: Detailed
Post-Care Instructions: I reviewed with the patient in detail post-care instructions. Patient is to wear sunprotection, and avoid picking at any of the treated lesions. Pt may apply Vaseline to crusted or scabbing areas.
Duration Of Freeze Thaw-Cycle (Seconds): 0
Show Applicator Variable?: Yes
Number Of Freeze-Thaw Cycles: 1 freeze-thaw cycle
Consent: The patient's consent was obtained including but not limited to risks of crusting, scabbing.

## 2023-07-06 NOTE — PROCEDURE: MIPS QUALITY
Quality 130: Documentation Of Current Medications In The Medical Record: Current Medications Documented
Detail Level: Detailed
Quality 402: Tobacco Use And Help With Quitting Among Adolescents: Patient screened for tobacco and never smoked
Quality 431: Preventive Care And Screening: Unhealthy Alcohol Use - Screening: Patient not identified as an unhealthy alcohol user when screened for unhealthy alcohol use using a systematic screening method
Quality 110: Preventive Care And Screening: Influenza Immunization: Influenza Immunization not Administered because Patient Refused.
Quality 226: Preventive Care And Screening: Tobacco Use: Screening And Cessation Intervention: Patient screened for tobacco use and is an ex/non-smoker

## 2023-12-08 ENCOUNTER — APPOINTMENT (OUTPATIENT)
Dept: GENERAL RADIOLOGY | Age: 88
End: 2023-12-08
Attending: EMERGENCY MEDICINE

## 2023-12-08 ENCOUNTER — HOSPITAL ENCOUNTER (EMERGENCY)
Age: 88
Discharge: HOME | End: 2023-12-08
Attending: EMERGENCY MEDICINE

## 2023-12-08 VITALS
WEIGHT: 168 LBS | HEART RATE: 66 BPM | BODY MASS INDEX: 22.75 KG/M2 | TEMPERATURE: 98.1 F | DIASTOLIC BLOOD PRESSURE: 72 MMHG | OXYGEN SATURATION: 99 % | SYSTOLIC BLOOD PRESSURE: 130 MMHG | HEIGHT: 72 IN | RESPIRATION RATE: 18 BRPM

## 2023-12-08 DIAGNOSIS — R05.1 ACUTE COUGH: Primary | ICD-10-CM

## 2023-12-08 LAB
ALBUMIN SERPL-MCNC: 2.9 G/DL (ref 3.6–5.1)
ALBUMIN/GLOB SERPL: 0.6 {RATIO} (ref 1–2.4)
ALP SERPL-CCNC: 68 UNITS/L (ref 45–117)
ALT SERPL-CCNC: 32 UNITS/L
ANION GAP SERPL CALC-SCNC: 14 MMOL/L (ref 7–19)
AST SERPL-CCNC: 21 UNITS/L
BASOPHILS # BLD: 0 K/MCL (ref 0–0.3)
BASOPHILS NFR BLD: 0 %
BILIRUB SERPL-MCNC: 0.4 MG/DL (ref 0.2–1)
BUN SERPL-MCNC: 22 MG/DL (ref 6–20)
BUN/CREAT SERPL: 21 (ref 7–25)
CALCIUM SERPL-MCNC: 9.1 MG/DL (ref 8.4–10.2)
CHLORIDE SERPL-SCNC: 106 MMOL/L (ref 97–110)
CO2 SERPL-SCNC: 26 MMOL/L (ref 21–32)
CREAT SERPL-MCNC: 1.04 MG/DL (ref 0.67–1.17)
DEPRECATED RDW RBC: 45.1 FL (ref 39–50)
EGFRCR SERPLBLD CKD-EPI 2021: 70 ML/MIN/{1.73_M2}
EOSINOPHIL # BLD: 0.2 K/MCL (ref 0–0.5)
EOSINOPHIL NFR BLD: 2 %
ERYTHROCYTE [DISTWIDTH] IN BLOOD: 12.2 % (ref 11–15)
FASTING DURATION TIME PATIENT: ABNORMAL H
FLUAV RNA RESP QL NAA+PROBE: NOT DETECTED
FLUBV RNA RESP QL NAA+PROBE: NOT DETECTED
GLOBULIN SER-MCNC: 4.7 G/DL (ref 2–4)
GLUCOSE SERPL-MCNC: 79 MG/DL (ref 70–99)
HCT VFR BLD CALC: 48.1 % (ref 39–51)
HGB BLD-MCNC: 16 G/DL (ref 13–17)
IMM GRANULOCYTES # BLD AUTO: 0 K/MCL (ref 0–0.2)
IMM GRANULOCYTES # BLD: 0 %
LACTATE BLDV-SCNC: 1.3 MMOL/L (ref 0–2)
LYMPHOCYTES # BLD: 1.3 K/MCL (ref 1–4)
LYMPHOCYTES NFR BLD: 14 %
MCH RBC QN AUTO: 33.4 PG (ref 26–34)
MCHC RBC AUTO-ENTMCNC: 33.3 G/DL (ref 32–36.5)
MCV RBC AUTO: 100.4 FL (ref 78–100)
MONOCYTES # BLD: 1 K/MCL (ref 0.3–0.9)
MONOCYTES NFR BLD: 10 %
NEUTROPHILS # BLD: 7.1 K/MCL (ref 1.8–7.7)
NEUTROPHILS NFR BLD: 74 %
NRBC BLD MANUAL-RTO: 0 /100 WBC
NT-PROBNP SERPL-MCNC: 47 PG/ML
PLATELET # BLD AUTO: 258 K/MCL (ref 140–450)
POTASSIUM SERPL-SCNC: 4.5 MMOL/L (ref 3.4–5.1)
PROT SERPL-MCNC: 7.6 G/DL (ref 6.4–8.2)
RBC # BLD: 4.79 MIL/MCL (ref 4.5–5.9)
RSV AG NPH QL IA.RAPID: NOT DETECTED
SARS-COV-2 RNA RESP QL NAA+PROBE: NOT DETECTED
SERVICE CMNT-IMP: NORMAL
SERVICE CMNT-IMP: NORMAL
SODIUM SERPL-SCNC: 141 MMOL/L (ref 135–145)
WBC # BLD: 9.7 K/MCL (ref 4.2–11)

## 2023-12-08 PROCEDURE — 93005 ELECTROCARDIOGRAM TRACING: CPT | Performed by: EMERGENCY MEDICINE

## 2023-12-08 PROCEDURE — 80053 COMPREHEN METABOLIC PANEL: CPT | Performed by: EMERGENCY MEDICINE

## 2023-12-08 PROCEDURE — 0241U COVID/FLU/RSV PANEL: CPT | Performed by: EMERGENCY MEDICINE

## 2023-12-08 PROCEDURE — 36415 COLL VENOUS BLD VENIPUNCTURE: CPT

## 2023-12-08 PROCEDURE — 83605 ASSAY OF LACTIC ACID: CPT | Performed by: EMERGENCY MEDICINE

## 2023-12-08 PROCEDURE — 94640 AIRWAY INHALATION TREATMENT: CPT

## 2023-12-08 PROCEDURE — 10002801 HB RX 250 W/O HCPCS: Performed by: PHYSICIAN ASSISTANT

## 2023-12-08 PROCEDURE — 83880 ASSAY OF NATRIURETIC PEPTIDE: CPT | Performed by: PHYSICIAN ASSISTANT

## 2023-12-08 PROCEDURE — 71045 X-RAY EXAM CHEST 1 VIEW: CPT

## 2023-12-08 PROCEDURE — 87040 BLOOD CULTURE FOR BACTERIA: CPT | Performed by: EMERGENCY MEDICINE

## 2023-12-08 PROCEDURE — 85025 COMPLETE CBC W/AUTO DIFF WBC: CPT | Performed by: EMERGENCY MEDICINE

## 2023-12-08 PROCEDURE — C9803 HOPD COVID-19 SPEC COLLECT: HCPCS

## 2023-12-08 PROCEDURE — 99283 EMERGENCY DEPT VISIT LOW MDM: CPT

## 2023-12-08 RX ORDER — ALBUTEROL SULFATE 90 UG/1
2 AEROSOL, METERED RESPIRATORY (INHALATION) EVERY 4 HOURS PRN
Qty: 8.5 G | Refills: 0 | Status: SHIPPED | OUTPATIENT
Start: 2023-12-08

## 2023-12-08 RX ORDER — IPRATROPIUM BROMIDE AND ALBUTEROL SULFATE 2.5; .5 MG/3ML; MG/3ML
3 SOLUTION RESPIRATORY (INHALATION) ONCE
Status: COMPLETED | OUTPATIENT
Start: 2023-12-08 | End: 2023-12-08

## 2023-12-08 RX ADMIN — IPRATROPIUM BROMIDE AND ALBUTEROL SULFATE 3 ML: 2.5; .5 SOLUTION RESPIRATORY (INHALATION) at 12:42

## 2023-12-08 ASSESSMENT — PAIN SCALES - GENERAL: PAINLEVEL_OUTOF10: 0

## 2023-12-09 LAB
ATRIAL RATE (BPM): 66
BACTERIA BLD CULT: NORMAL
P AXIS (DEGREES): 33
PR-INTERVAL (MSEC): 228
QRS-INTERVAL (MSEC): 88
QT-INTERVAL (MSEC): 360
QTC: 377
R AXIS (DEGREES): -31
REPORT TEXT: NORMAL
T AXIS (DEGREES): 5
VENTRICULAR RATE EKG/MIN (BPM): 66

## 2023-12-13 LAB — BACTERIA BLD CULT: NORMAL

## 2024-05-01 ENCOUNTER — APPOINTMENT (OUTPATIENT)
Dept: URBAN - METROPOLITAN AREA CLINIC 240 | Age: 89
Setting detail: DERMATOLOGY
End: 2024-05-01

## 2024-05-01 DIAGNOSIS — L57.0 ACTINIC KERATOSIS: ICD-10-CM

## 2024-05-01 DIAGNOSIS — D49.2 NEOPLASM OF UNSPECIFIED BEHAVIOR OF BONE, SOFT TISSUE, AND SKIN: ICD-10-CM

## 2024-05-01 DIAGNOSIS — L81.4 OTHER MELANIN HYPERPIGMENTATION: ICD-10-CM

## 2024-05-01 DIAGNOSIS — D22 MELANOCYTIC NEVI: ICD-10-CM

## 2024-05-01 DIAGNOSIS — L57.8 OTHER SKIN CHANGES DUE TO CHRONIC EXPOSURE TO NONIONIZING RADIATION: ICD-10-CM

## 2024-05-01 DIAGNOSIS — D18.0 HEMANGIOMA: ICD-10-CM

## 2024-05-01 DIAGNOSIS — Z12.83 ENCOUNTER FOR SCREENING FOR MALIGNANT NEOPLASM OF SKIN: ICD-10-CM

## 2024-05-01 DIAGNOSIS — L82.1 OTHER SEBORRHEIC KERATOSIS: ICD-10-CM

## 2024-05-01 DIAGNOSIS — Z85.828 PERSONAL HISTORY OF OTHER MALIGNANT NEOPLASM OF SKIN: ICD-10-CM

## 2024-05-01 DIAGNOSIS — L56.8 OTHER SPECIFIED ACUTE SKIN CHANGES DUE TO ULTRAVIOLET RADIATION: ICD-10-CM

## 2024-05-01 PROBLEM — D22.5 MELANOCYTIC NEVI OF TRUNK: Status: ACTIVE | Noted: 2024-05-01

## 2024-05-01 PROBLEM — D18.01 HEMANGIOMA OF SKIN AND SUBCUTANEOUS TISSUE: Status: ACTIVE | Noted: 2024-05-01

## 2024-05-01 PROCEDURE — OTHER PHOTODYNAMIC THERAPY COUNSELING: OTHER

## 2024-05-01 PROCEDURE — 17000 DESTRUCT PREMALG LESION: CPT | Mod: 59

## 2024-05-01 PROCEDURE — OTHER BIOPSY BY SHAVE METHOD: OTHER

## 2024-05-01 PROCEDURE — 11103 TANGNTL BX SKIN EA SEP/ADDL: CPT

## 2024-05-01 PROCEDURE — OTHER MIPS QUALITY: OTHER

## 2024-05-01 PROCEDURE — OTHER LIQUID NITROGEN: OTHER

## 2024-05-01 PROCEDURE — OTHER COUNSELING: OTHER

## 2024-05-01 PROCEDURE — 11102 TANGNTL BX SKIN SINGLE LES: CPT

## 2024-05-01 PROCEDURE — 99213 OFFICE O/P EST LOW 20 MIN: CPT | Mod: 25

## 2024-05-01 ASSESSMENT — LOCATION ZONE DERM
LOCATION ZONE: TRUNK
LOCATION ZONE: LEG
LOCATION ZONE: NOSE
LOCATION ZONE: FACE
LOCATION ZONE: ARM

## 2024-05-01 ASSESSMENT — LOCATION SIMPLE DESCRIPTION DERM
LOCATION SIMPLE: RIGHT FOREARM
LOCATION SIMPLE: LEFT FOREHEAD
LOCATION SIMPLE: ABDOMEN
LOCATION SIMPLE: LEFT CHEEK
LOCATION SIMPLE: NOSE
LOCATION SIMPLE: RIGHT UPPER BACK
LOCATION SIMPLE: LEFT PRETIBIAL REGION

## 2024-05-01 ASSESSMENT — LOCATION DETAILED DESCRIPTION DERM
LOCATION DETAILED: LEFT MEDIAL FOREHEAD
LOCATION DETAILED: LEFT RIB CAGE
LOCATION DETAILED: LEFT PROXIMAL PRETIBIAL REGION
LOCATION DETAILED: LEFT SUPERIOR MEDIAL FOREHEAD
LOCATION DETAILED: LEFT CENTRAL MALAR CHEEK
LOCATION DETAILED: RIGHT SUPERIOR UPPER BACK
LOCATION DETAILED: NASAL DORSUM
LOCATION DETAILED: RIGHT MEDIAL UPPER BACK
LOCATION DETAILED: RIGHT DISTAL DORSAL FOREARM

## 2024-05-01 NOTE — PROCEDURE: LIQUID NITROGEN
Show Applicator Variable?: Yes
Detail Level: Detailed
Post-Care Instructions: I reviewed with the patient in detail post-care instructions. Patient is to wear sunprotection, and avoid picking at any of the treated lesions. Pt may apply Vaseline to crusted or scabbing areas.
Render Post-Care Instructions In Note?: no
Duration Of Freeze Thaw-Cycle (Seconds): 0
Consent: The patient's consent was obtained including but not limited to risks of crusting.

## 2024-05-01 NOTE — PROCEDURE: BIOPSY BY SHAVE METHOD
Detail Level: Detailed
Depth Of Biopsy: dermis
Was A Bandage Applied: Yes
Size Of Lesion In Cm: 1.4
X Size Of Lesion In Cm: 0.7
Anticipated Plan (Based On Presumed Biopsy Results): [facility]
Biopsy Type: H and E
Biopsy Method: sterile single edge surgical blade
Anesthesia Type: 1% lidocaine with epinephrine and a 1:10 solution of 8.4% sodium bicarbonate
Anesthesia Volume In Cc: 0.5
Additional Anesthesia Volume In Cc (Will Not Render If 0): 0
Hemostasis: Ferric chloride
Wound Care: No ointment
Dressing: bandage
Destruction After The Procedure: No
Type Of Destruction Used: Curettage
Curettage Text: The wound bed was treated with curettage after the biopsy was performed.
Cryotherapy Text: The wound bed was treated with cryotherapy after the biopsy was performed.
Electrodesiccation Text: The wound bed was treated with electrodesiccation after the biopsy was performed.
Electrodesiccation And Curettage Text: The wound bed was treated with electrodesiccation and curettage after the biopsy was performed.
Silver Nitrate Text: The wound bed was treated with silver nitrate after the biopsy was performed.
Lab: -[id number]
Consent: Written consent was obtained and risks were reviewed including but not limited to bleeding, scabbing.
Post-Care Instructions: I reviewed with the patient in detail post-care instructions. Patient is to keep the biopsy site dry overnight, and then apply bacitracin twice daily until healed. Patient may apply hydrogen peroxide soaks to remove any crusting.
Notification Instructions: Patient will be notified of biopsy results. However, patient instructed to call the office if not contacted within 2 weeks.
Billing Type: Third-Party Bill
Information: Selecting Yes will display possible errors in your note based on the variables you have selected. This validation is only offered as a suggestion for you. PLEASE NOTE THAT THE VALIDATION TEXT WILL BE REMOVED WHEN YOU FINALIZE YOUR NOTE. IF YOU WANT TO FAX A PRELIMINARY NOTE YOU WILL NEED TO TOGGLE THIS TO 'NO' IF YOU DO NOT WANT IT IN YOUR FAXED NOTE.

## 2024-05-29 ENCOUNTER — APPOINTMENT (OUTPATIENT)
Dept: URBAN - METROPOLITAN AREA CLINIC 240 | Age: 89
Setting detail: DERMATOLOGY
End: 2024-05-29

## 2024-05-29 DIAGNOSIS — L57.0 ACTINIC KERATOSIS: ICD-10-CM

## 2024-05-29 PROBLEM — D04.61 CARCINOMA IN SITU OF SKIN OF RIGHT UPPER LIMB, INCLUDING SHOULDER: Status: ACTIVE | Noted: 2024-05-29

## 2024-05-29 PROCEDURE — OTHER CURETTAGE AND DESTRUCTION: OTHER

## 2024-05-29 PROCEDURE — OTHER PHOTO-DOCUMENTATION: OTHER

## 2024-05-29 PROCEDURE — 17003 DESTRUCT PREMALG LES 2-14: CPT

## 2024-05-29 PROCEDURE — 17000 DESTRUCT PREMALG LESION: CPT | Mod: 59

## 2024-05-29 PROCEDURE — 99213 OFFICE O/P EST LOW 20 MIN: CPT | Mod: 25

## 2024-05-29 PROCEDURE — OTHER ORDER FOR PHOTODYNAMIC THERAPY: OTHER

## 2024-05-29 PROCEDURE — OTHER ADDITIONAL NOTES: OTHER

## 2024-05-29 PROCEDURE — OTHER LIQUID NITROGEN: OTHER

## 2024-05-29 PROCEDURE — 17262 DSTRJ MAL LES T/A/L 1.1-2.0: CPT

## 2024-05-29 PROCEDURE — OTHER PRESCRIPTION: OTHER

## 2024-05-29 PROCEDURE — OTHER COUNSELING: OTHER

## 2024-05-29 RX ORDER — MUPIROCIN 20 MG/G
OINTMENT TOPICAL
Qty: 22 | Refills: 0 | Status: ERX | COMMUNITY
Start: 2024-05-29

## 2024-05-29 ASSESSMENT — LOCATION SIMPLE DESCRIPTION DERM
LOCATION SIMPLE: RIGHT EYEBROW
LOCATION SIMPLE: RIGHT FOREHEAD
LOCATION SIMPLE: LEFT CHEEK
LOCATION SIMPLE: LEFT ZYGOMA
LOCATION SIMPLE: RIGHT CHEEK

## 2024-05-29 ASSESSMENT — LOCATION ZONE DERM: LOCATION ZONE: FACE

## 2024-05-29 ASSESSMENT — LOCATION DETAILED DESCRIPTION DERM
LOCATION DETAILED: RIGHT INFERIOR FOREHEAD
LOCATION DETAILED: RIGHT INFERIOR LATERAL MALAR CHEEK
LOCATION DETAILED: LEFT CENTRAL ZYGOMA
LOCATION DETAILED: RIGHT LATERAL EYEBROW
LOCATION DETAILED: LEFT CENTRAL MALAR CHEEK

## 2024-05-29 NOTE — PROCEDURE: ADDITIONAL NOTES
Additional Notes: final size after curettage was 1.6 cm x 1.0 cm
Render Risk Assessment In Note?: no
Detail Level: Simple

## 2024-05-29 NOTE — PROCEDURE: CURETTAGE AND DESTRUCTION
Detail Level: Detailed
Accession # (Optional): [id number]
Number Of Curettages: 3
Size Of Lesion After Curettage: 1.6
Add Intralesional Injection: No
Total Volume (Ccs): 1
Anesthesia Type: 1% lidocaine with epinephrine and a 1:10 solution of 8.4% sodium bicarbonate
Cautery Type: cryotherapy
What Was Performed First?: Curettage
Final Size Statement: The size of the lesion after curettage was
Additional Information: (Optional): The wound was cleaned, and a bandage was applied.  The patient received detailed post-op instructions.
Consent was obtained from the patient. The risks, benefits and alternatives to therapy were discussed in detail. Specifically, the risks of infection, scarring, bleeding, prolonged wound healing, nerve injury, incomplete removal, allergy to anesthesia and recurrence were addressed. Alternatives to ED&C, such as: surgical removal and XRT were also discussed.  Prior to the procedure, the treatment site was clearly identified and confirmed by the patient. All components of Universal Protocol/PAUSE Rule completed.
Post-Care Instructions: I reviewed with the patient in detail post-care instructions. Patient is to keep the area dry for 48 hours, and not to engage in any swimming until the area is healed. Should the patient develop any fevers, chills, bleeding, severe pain patient will contact the office immediately.
Bill As A Line Item Or As Units: Line Item

## 2024-05-29 NOTE — PROCEDURE: ORDER FOR PHOTODYNAMIC THERAPY
Location: Face
Detail Level: Zone
Arms And Hands Incubation Time: 2 Hours
Photosensitizer: Levulan
Face And Ears Incubation Time: 1 Hour
Consent: The procedure and risks were reviewed with the patient including but not limited to: burning, pigmentary changes, pain, blistering, scabbing, redness, and the possibility of needing numerous treatments. Strict photoprotection after the procedure was also discussed.
Frequency Of Pdt: Single Treatment
Occlusion: No
Pdt Type: ELSA-U
Face And Scalp Incubation Time: 1 Hour for the face and 2 Hours for the scalp

## 2024-05-29 NOTE — PROCEDURE: LIQUID NITROGEN
Application Tool (Optional): Liquid Nitrogen Sprayer
Show Aperture Variable?: Yes
Post-Care Instructions: I reviewed with the patient in detail post-care instructions. Patient is to wear sunprotection, and avoid picking at any of the treated lesions. Pt may apply Vaseline to crusted or scabbing areas.
Render Post-Care Instructions In Note?: no
Consent: The patient's consent was obtained including but not limited to risks of crusting, scabbing, blistering, scarring, darker or lighter pigmentary change, recurrence, incomplete removal and infection.
Duration Of Freeze Thaw-Cycle (Seconds): 0
Detail Level: Detailed

## 2025-01-03 ENCOUNTER — APPOINTMENT (OUTPATIENT)
Dept: URBAN - METROPOLITAN AREA CLINIC 240 | Age: 89
Setting detail: DERMATOLOGY
End: 2025-01-06

## 2025-01-03 DIAGNOSIS — L57.8 OTHER SKIN CHANGES DUE TO CHRONIC EXPOSURE TO NONIONIZING RADIATION: ICD-10-CM

## 2025-01-03 PROCEDURE — OTHER PDT: BLUE: OTHER

## 2025-01-03 PROCEDURE — 96567 PDT DSTR PRMLG LES SKN: CPT

## 2025-01-03 PROCEDURE — OTHER COUNSELING: OTHER

## 2025-01-03 ASSESSMENT — LOCATION ZONE DERM: LOCATION ZONE: FACE

## 2025-01-03 ASSESSMENT — LOCATION DETAILED DESCRIPTION DERM: LOCATION DETAILED: INFERIOR MID FOREHEAD

## 2025-01-03 ASSESSMENT — LOCATION SIMPLE DESCRIPTION DERM: LOCATION SIMPLE: INFERIOR FOREHEAD

## 2025-01-03 NOTE — PROCEDURE: PDT: BLUE
Consent: Written consent obtained.  The risks were reviewed with the patient including but not limited to: pigmentary changes, pain, blistering, scabbing, redness, and the remote possibility of scarring.
Detail Level: Zone
Incubation Time: 01:00:00
Number Of Kerasticks/Tubes Billed For: 1
Pre-Procedure Text: The treatment areas were cleaned and prepped in the usual fashion.
Anesthesia Type: 1% lidocaine with epinephrine
Light Source: Albert-U
Total Number Of Aks Treated (Optional To Report): 0
Which Photosensitizer Was Used: Levulan
Occlusion: No
Illumination Time: 00:16:40
Incubation Start Time: 12:30
Show Anesthesia In Plan?: Yes
Who Performed The Pdt (Staff): Ysabel RUTH
Debridement Text (Will Only Render In Visit Note If You Select Debridement Option Under Who Performed The Pdt Field): Prior to application of the photodynamic medication the hyperkeratotic lesions were curetted to make them more amenable to therapy.
Post-Care Instructions: I reviewed with the patient in detail post-care instructions. Patient is to avoid sunlight for the next 2 days, and wear sun protection. Patients may expect sunburn like redness, discomfort and scabbing.
Comments: Treated areas: face.\\n\\nPatient was not instructed to pretreat with Efudex.\\n\\nPatient reports last PDT was 20+ years ago.\\n\\nSPF applied to treated areas after PDT.\\n\\nPatient was given aftercare handout and instructed to follow up in 1 month- [place] out of [place] and will follow up before leaving. 
Medical Necessity: Precancerous Lesions
Who Performed The Pdt?: Performed by Nurse, MA or Aesthetician ([id number])
Show Inventory Tab: Show
Incubation End Time: 01:30

## 2025-04-23 ENCOUNTER — APPOINTMENT (OUTPATIENT)
Dept: URBAN - METROPOLITAN AREA CLINIC 240 | Age: 89
Setting detail: DERMATOLOGY
End: 2025-04-23

## 2025-04-23 DIAGNOSIS — L57.8 OTHER SKIN CHANGES DUE TO CHRONIC EXPOSURE TO NONIONIZING RADIATION: ICD-10-CM

## 2025-04-23 DIAGNOSIS — Z12.83 ENCOUNTER FOR SCREENING FOR MALIGNANT NEOPLASM OF SKIN: ICD-10-CM

## 2025-04-23 DIAGNOSIS — L82.1 OTHER SEBORRHEIC KERATOSIS: ICD-10-CM

## 2025-04-23 DIAGNOSIS — L81.4 OTHER MELANIN HYPERPIGMENTATION: ICD-10-CM

## 2025-04-23 DIAGNOSIS — D22 MELANOCYTIC NEVI: ICD-10-CM

## 2025-04-23 DIAGNOSIS — L57.0 ACTINIC KERATOSIS: ICD-10-CM

## 2025-04-23 DIAGNOSIS — D49.2 NEOPLASM OF UNSPECIFIED BEHAVIOR OF BONE, SOFT TISSUE, AND SKIN: ICD-10-CM

## 2025-04-23 DIAGNOSIS — L56.8 OTHER SPECIFIED ACUTE SKIN CHANGES DUE TO ULTRAVIOLET RADIATION: ICD-10-CM

## 2025-04-23 DIAGNOSIS — Z85.828 PERSONAL HISTORY OF OTHER MALIGNANT NEOPLASM OF SKIN: ICD-10-CM

## 2025-04-23 DIAGNOSIS — D18.0 HEMANGIOMA: ICD-10-CM

## 2025-04-23 PROBLEM — D18.01 HEMANGIOMA OF SKIN AND SUBCUTANEOUS TISSUE: Status: ACTIVE | Noted: 2025-04-23

## 2025-04-23 PROBLEM — D22.5 MELANOCYTIC NEVI OF TRUNK: Status: ACTIVE | Noted: 2025-04-23

## 2025-04-23 PROCEDURE — 69100 BIOPSY OF EXTERNAL EAR: CPT | Mod: 59,LT

## 2025-04-23 PROCEDURE — 17003 DESTRUCT PREMALG LES 2-14: CPT

## 2025-04-23 PROCEDURE — 99213 OFFICE O/P EST LOW 20 MIN: CPT | Mod: 25

## 2025-04-23 PROCEDURE — 17000 DESTRUCT PREMALG LESION: CPT

## 2025-04-23 PROCEDURE — OTHER BIOPSY BY SHAVE METHOD: OTHER

## 2025-04-23 PROCEDURE — OTHER LIQUID NITROGEN: OTHER

## 2025-04-23 PROCEDURE — OTHER PRESCRIPTION MEDICATION MANAGEMENT: OTHER

## 2025-04-23 PROCEDURE — OTHER MIPS QUALITY: OTHER

## 2025-04-23 PROCEDURE — OTHER PRESCRIPTION: OTHER

## 2025-04-23 PROCEDURE — OTHER COUNSELING: OTHER

## 2025-04-23 RX ORDER — TIRBANIBULIN 10 MG/G
OINTMENT TOPICAL
Qty: 5 | Refills: 1 | Status: ERX | COMMUNITY
Start: 2025-04-23

## 2025-04-23 ASSESSMENT — LOCATION SIMPLE DESCRIPTION DERM
LOCATION SIMPLE: RIGHT HAND
LOCATION SIMPLE: LEFT EAR
LOCATION SIMPLE: RIGHT UPPER ARM
LOCATION SIMPLE: LEFT CHEEK
LOCATION SIMPLE: ABDOMEN
LOCATION SIMPLE: LEFT EAR
LOCATION SIMPLE: RIGHT FOREARM
LOCATION SIMPLE: LEFT FOREHEAD
LOCATION SIMPLE: LEFT PRETIBIAL REGION
LOCATION SIMPLE: LEFT UPPER ARM
LOCATION SIMPLE: RIGHT WRIST
LOCATION SIMPLE: RIGHT UPPER BACK

## 2025-04-23 ASSESSMENT — LOCATION DETAILED DESCRIPTION DERM
LOCATION DETAILED: RIGHT ANTERIOR DISTAL UPPER ARM
LOCATION DETAILED: LEFT ANTERIOR PROXIMAL UPPER ARM
LOCATION DETAILED: RIGHT MEDIAL UPPER BACK
LOCATION DETAILED: LEFT SUPERIOR HELIX
LOCATION DETAILED: RIGHT DORSAL WRIST
LOCATION DETAILED: LEFT SUPERIOR HELIX
LOCATION DETAILED: LEFT MEDIAL FOREHEAD
LOCATION DETAILED: LEFT RIB CAGE
LOCATION DETAILED: RIGHT RADIAL DORSAL HAND
LOCATION DETAILED: LEFT MEDIAL MALAR CHEEK
LOCATION DETAILED: RIGHT DISTAL ULNAR DORSAL FOREARM
LOCATION DETAILED: LEFT PROXIMAL PRETIBIAL REGION
LOCATION DETAILED: RIGHT SUPERIOR UPPER BACK

## 2025-04-23 ASSESSMENT — LOCATION ZONE DERM
LOCATION ZONE: TRUNK
LOCATION ZONE: EAR
LOCATION ZONE: EAR
LOCATION ZONE: ARM
LOCATION ZONE: FACE
LOCATION ZONE: LEG
LOCATION ZONE: HAND

## 2025-04-23 NOTE — PROCEDURE: PRESCRIPTION MEDICATION MANAGEMENT
Detail Level: Zone
Initiate Treatment: Klysiri ointment apply a thin layer to forehead down to cheeks daily x 5 days.
Plan: Start medication when patient returns from [place].
Render In Strict Bullet Format?: No

## 2025-04-23 NOTE — PROCEDURE: BIOPSY BY SHAVE METHOD
Detail Level: Detailed
Depth Of Biopsy: dermis
Was A Bandage Applied: Yes
Size Of Lesion In Cm: 1
X Size Of Lesion In Cm: 0
Anticipated Plan (Based On Presumed Biopsy Results): Mohs surgery Dr Chapman
Biopsy Type: H and E
Biopsy Method: sterile single edge surgical blade
Anesthesia Type: 1% lidocaine with epinephrine and a 1:10 solution of 8.4% sodium bicarbonate
Anesthesia Volume In Cc: 0.5
Hemostasis: Ferric chloride
Wound Care: No ointment
Dressing: bandage
Destruction After The Procedure: No
Type Of Destruction Used: Curettage
Curettage Text: The wound bed was treated with curettage after the biopsy was performed.
Cryotherapy Text: The wound bed was treated with cryotherapy after the biopsy was performed.
Electrodesiccation Text: The wound bed was treated with electrodesiccation after the biopsy was performed.
Electrodesiccation And Curettage Text: The wound bed was treated with electrodesiccation and curettage after the biopsy was performed.
Silver Nitrate Text: The wound bed was treated with silver nitrate after the biopsy was performed.
Lab: -[id number]
Medical Necessity Information: It is in your best interest to select a reason for this procedure from the list below. All of these items fulfill various CMS LCD requirements except the new and changing color options.
Consent: Written consent was obtained and risks were reviewed including but not limited to scarring, infection, bleeding, scabbing.
Post-Care Instructions: I reviewed with the patient in detail post-care instructions.
Notification Instructions: Patient will be notified of biopsy results. However, patient instructed to call the office if not contacted within 2 weeks.
Billing Type: Third-Party Bill
Information: Selecting Yes will display possible errors in your note based on the variables you have selected. This validation is only offered as a suggestion for you. PLEASE NOTE THAT THE VALIDATION TEXT WILL BE REMOVED WHEN YOU FINALIZE YOUR NOTE. IF YOU WANT TO FAX A PRELIMINARY NOTE YOU WILL NEED TO TOGGLE THIS TO 'NO' IF YOU DO NOT WANT IT IN YOUR FAXED NOTE.

## 2025-07-17 ENCOUNTER — APPOINTMENT (OUTPATIENT)
Dept: URBAN - METROPOLITAN AREA CLINIC 240 | Age: 89
Setting detail: DERMATOLOGY
End: 2025-07-18

## 2025-07-17 DIAGNOSIS — L57.0 ACTINIC KERATOSIS: ICD-10-CM

## 2025-07-17 DIAGNOSIS — Z85.828 PERSONAL HISTORY OF OTHER MALIGNANT NEOPLASM OF SKIN: ICD-10-CM

## 2025-07-17 PROCEDURE — OTHER DIAGNOSIS COMMENT: OTHER

## 2025-07-17 PROCEDURE — OTHER COUNSELING: OTHER

## 2025-07-17 PROCEDURE — 99212 OFFICE O/P EST SF 10 MIN: CPT

## 2025-07-17 PROCEDURE — OTHER PHOTO-DOCUMENTATION: OTHER

## 2025-07-17 PROCEDURE — OTHER ADDITIONAL NOTES: OTHER

## 2025-08-19 ENCOUNTER — APPOINTMENT (OUTPATIENT)
Dept: URBAN - METROPOLITAN AREA CLINIC 240 | Age: 89
Setting detail: DERMATOLOGY
End: 2025-08-19

## 2025-08-19 DIAGNOSIS — L24.4 IRRITANT CONTACT DERMATITIS DUE TO DRUGS IN CONTACT WITH SKIN: ICD-10-CM

## 2025-08-19 DIAGNOSIS — D49.2 NEOPLASM OF UNSPECIFIED BEHAVIOR OF BONE, SOFT TISSUE, AND SKIN: ICD-10-CM

## 2025-08-19 DIAGNOSIS — L57.0 ACTINIC KERATOSIS: ICD-10-CM

## 2025-08-19 PROCEDURE — 69100 BIOPSY OF EXTERNAL EAR: CPT | Mod: LT

## 2025-08-19 PROCEDURE — 99213 OFFICE O/P EST LOW 20 MIN: CPT | Mod: 25

## 2025-08-19 PROCEDURE — OTHER ADDITIONAL NOTES: OTHER

## 2025-08-19 PROCEDURE — OTHER ORDER FOR PHOTODYNAMIC THERAPY: OTHER

## 2025-08-19 PROCEDURE — OTHER COUNSELING: OTHER

## 2025-08-19 PROCEDURE — OTHER PRESCRIPTION MEDICATION MANAGEMENT: OTHER

## 2025-08-19 PROCEDURE — OTHER PRESCRIPTION: OTHER

## 2025-08-19 PROCEDURE — OTHER BIOPSY BY SHAVE METHOD: OTHER

## 2025-08-19 RX ORDER — FLUOROURACIL 5 MG/G
CREAM TOPICAL
Qty: 40 | Refills: 0 | Status: ERX | COMMUNITY
Start: 2025-08-19

## 2025-08-19 ASSESSMENT — LOCATION DETAILED DESCRIPTION DERM: LOCATION DETAILED: LEFT SUPERIOR HELIX

## 2025-08-19 ASSESSMENT — LOCATION SIMPLE DESCRIPTION DERM: LOCATION SIMPLE: LEFT EAR

## 2025-08-19 ASSESSMENT — LOCATION ZONE DERM: LOCATION ZONE: EAR
